# Patient Record
Sex: MALE | Race: BLACK OR AFRICAN AMERICAN | NOT HISPANIC OR LATINO | URBAN - METROPOLITAN AREA
[De-identification: names, ages, dates, MRNs, and addresses within clinical notes are randomized per-mention and may not be internally consistent; named-entity substitution may affect disease eponyms.]

---

## 2023-09-08 ENCOUNTER — HOSPITAL ENCOUNTER (EMERGENCY)
Facility: HOSPITAL | Age: 23
Discharge: HOME | End: 2023-09-09
Attending: EMERGENCY MEDICINE
Payer: COMMERCIAL

## 2023-09-08 VITALS
DIASTOLIC BLOOD PRESSURE: 94 MMHG | OXYGEN SATURATION: 100 % | RESPIRATION RATE: 16 BRPM | TEMPERATURE: 98 F | SYSTOLIC BLOOD PRESSURE: 134 MMHG | HEART RATE: 74 BPM

## 2023-09-08 DIAGNOSIS — L03.311 CELLULITIS OF ABDOMINAL WALL: Primary | ICD-10-CM

## 2023-09-08 PROCEDURE — 99283 EMERGENCY DEPT VISIT LOW MDM: CPT

## 2023-09-09 RX ORDER — SULFAMETHOXAZOLE AND TRIMETHOPRIM 800; 160 MG/1; MG/1
1 TABLET ORAL 2 TIMES DAILY
Qty: 20 TABLET | Refills: 0 | Status: SHIPPED | OUTPATIENT
Start: 2023-09-09 | End: 2023-09-19

## 2023-09-09 NOTE — ED TRIAGE NOTES
Donavon Wood, a 22 y.o. male presents to the ED w/ complaint of insect bite. Reports spider bites on abdomen. Endorses swelling and pain    Adult Physical Assessment  LOC: Donavon Wood, 22 y.o. male verified via two identifiers.  The patient is awake, alert, oriented and speaking appropriately at this time.  APPEARANCE: Patient resting comfortably and appears to be in no acute distress at this time. Patient is clean and well groomed, patient's clothing is properly fastened.  SKIN:The skin is warm and dry, color consistent with ethnicity, patient has normal skin turgor and moist mucus membranes, skin intact, no breakdown or brusing noted.  MUSCULOSKELETAL: Patient moving all extremities well, no obvious swelling or deformities noted.  RESPIRATORY: Airway is open and patent, respirations are spontaneous, patient has a normal effort and rate, no accessory muscle use noted.  CARDIAC: Patient has a normal rate and rhythm, no periphreal edema noted in any extremity, capillary refill < 3 seconds in all extremities  ABDOMEN: Soft and non tender to palpation, no abdominal distention noted. Bowel sounds present in all four quadrants.Reports spider bites on abdomen. Endorses swelling and pain  NEUROLOGIC: Eyes open spontaneously, behavior appropriate to situation, follows commands, facial expression symmetrical, bilateral hand grasp equal and even, purposeful motor response noted, normal sensation in all extremities when touched with a finger.      Triage note:  Chief Complaint   Patient presents with    Insect Bite     Spider bites to abdomen on Saturday. Now having swelling around bites and 10/10 pain. Pt seen at  and started on Doxycycline Sat.     Review of patient's allergies indicates:  Not on File  No past medical history on file.

## 2023-09-09 NOTE — ED PROVIDER NOTES
History:  Donavon Wood is a 22 y.o. male who presents to the ED with Insect Bite (Spider bites to abdomen on Saturday. Now having swelling around bites and 10/10 pain. Pt seen at  and started on Doxycycline Sat.)    Described as previously healthy 22-year-old male presenting to the emergency department with concern for spider bites.  He reports he noticed spider bites with redness and warmth as well as subjective fevers on his anterior abdomen 1 week ago.  He was placed on doxycycline and initially symptoms improved, though he did not complete the course secondary to having an allergic reaction including hives, headache, and blistering on dorsal hand.  Two days ago, he began to have increased pain at the sites on noticed swelling.  His fever has not returned.  He otherwise feels well.    Review of Systems: All systems reviewed and are negative except as per history of present illness.    Medications:   Discharge Medication List as of 9/9/2023 12:51 AM          PMH: History reviewed. No pertinent past medical history.  PSH: History reviewed. No pertinent surgical history.  Allergies: He is allergic to doxycycline.  Social History: Marital Status: single. He  reports that he has been smoking cigarettes. He does not have any smokeless tobacco history on file.. He  has no history on file for alcohol use..       Exam:  VITAL SIGNS:   Vitals:    09/08/23 2332   BP: (!) 134/94   Pulse: 74   Resp: 16   Temp: 98.2 °F (36.8 °C)   TempSrc: Oral   SpO2: 100%     Const: Awake and alert, NAD   Head: Atraumatic  Eyes: Normal Conjunctiva  ENT: Normal External Ears, Nose and Mouth.  Neck: Full range of motion. No meningismus.  Resp: Normal respiratory effort, No distress  Cardio: Equal and intact distal pulses  Abd: Soft, non tender, non distended.   Skin: 2x 3cm areas of induration on anterior abdominal wall with central scabbing and associated TTP. No erythema or warmth. No fluctuance.   Ext: No cyanosis, or edema  Neur: Awake  and alert  Psych: Normal Mood and Affect    Musculoskeletal/Soft tissue Ultrasound performed by me:  Indication: Evaluation for soft tissue infection   Location: abdominal wall  Findings:  There is soft tissue cobble-stoning without evidence of a focal fluid collection. Images archived in the medical record.     Medical Decision Makin-year-old male presenting to the emergency department with concern for skin infection after not completing his course of doxycycline secondary to allergic reaction.  He does have 2 areas of induration though no fluctuance, no fluid collection identified on bedside ultrasound.  Will start on Bactrim for mild cellulitis/phlegmon.  No signs of sepsis at this time.  Strict return precautions discussed, patient was agreeable with the plan.    Clinical Impression:  1. Cellulitis of abdominal wall             Medication List        START taking these medications      sulfamethoxazole-trimethoprim 800-160mg 800-160 mg Tab  Commonly known as: BACTRIM DS  Take 1 tablet by mouth 2 (two) times daily. for 10 days               Where to Get Your Medications        You can get these medications from any pharmacy    Bring a paper prescription for each of these medications  sulfamethoxazole-trimethoprim 800-160mg 800-160 mg Tab         Follow-up Information       Your PCP.               [facility] - Emergency Dept.    Specialty: Emergency Medicine  Why: If symptoms worsen  Contact information:  [address]  [place] [address] [phone]                             Francine Perez MD  [phone]